# Patient Record
Sex: MALE | Race: WHITE | NOT HISPANIC OR LATINO | Employment: OTHER | URBAN - NONMETROPOLITAN AREA
[De-identification: names, ages, dates, MRNs, and addresses within clinical notes are randomized per-mention and may not be internally consistent; named-entity substitution may affect disease eponyms.]

---

## 2023-02-09 PROBLEM — K21.9 CHRONIC GERD: Status: ACTIVE | Noted: 2023-02-09

## 2023-02-09 PROBLEM — M25.531 RIGHT WRIST PAIN: Status: ACTIVE | Noted: 2023-02-09

## 2023-02-09 PROBLEM — F32.9 MDD (MAJOR DEPRESSIVE DISORDER): Status: ACTIVE | Noted: 2023-02-09

## 2023-02-09 PROBLEM — R20.0 HAND NUMBNESS: Status: ACTIVE | Noted: 2023-02-09

## 2023-02-09 PROBLEM — R12 HEARTBURN: Status: ACTIVE | Noted: 2023-02-09

## 2023-02-09 PROBLEM — I77.810 AORTIC ECTASIA, THORACIC (CMS-HCC): Status: ACTIVE | Noted: 2023-02-09

## 2023-02-09 PROBLEM — I10 HTN (HYPERTENSION): Status: ACTIVE | Noted: 2023-02-09

## 2023-02-09 PROBLEM — R06.83 SNORING: Status: ACTIVE | Noted: 2023-02-09

## 2023-02-09 PROBLEM — U07.1 DISEASE DUE TO SEVERE ACUTE RESPIRATORY SYNDROME CORONAVIRUS 2 (SARS-COV-2): Status: ACTIVE | Noted: 2023-02-09

## 2023-02-09 PROBLEM — M75.101 RIGHT ROTATOR CUFF TEAR: Status: ACTIVE | Noted: 2023-02-09

## 2023-02-09 PROBLEM — E11.9 DM2 (DIABETES MELLITUS, TYPE 2) (MULTI): Status: ACTIVE | Noted: 2023-02-09

## 2023-02-09 PROBLEM — N40.0 BPH (BENIGN PROSTATIC HYPERPLASIA): Status: ACTIVE | Noted: 2023-02-09

## 2023-02-09 PROBLEM — K22.89 PATULOUS LOWER ESOPHAGEAL SPHINCTER: Status: ACTIVE | Noted: 2023-02-09

## 2023-02-09 PROBLEM — R35.0 INCREASED URINARY FREQUENCY: Status: ACTIVE | Noted: 2023-02-09

## 2023-02-09 PROBLEM — G47.19 EXCESSIVE DAYTIME SLEEPINESS: Status: ACTIVE | Noted: 2023-02-09

## 2023-02-09 PROBLEM — R97.20 HIGH PROSTATE SPECIFIC ANTIGEN (PSA): Status: ACTIVE | Noted: 2023-02-09

## 2023-02-09 PROBLEM — D58.2 ELEVATED HEMOGLOBIN (CMS-HCC): Status: ACTIVE | Noted: 2023-02-09

## 2023-02-09 PROBLEM — G56.03 BILATERAL CARPAL TUNNEL SYNDROME: Status: ACTIVE | Noted: 2023-02-09

## 2023-02-09 PROBLEM — G47.33 OSA ON CPAP: Status: ACTIVE | Noted: 2023-02-09

## 2023-02-09 PROBLEM — R35.1 NOCTURIA: Status: ACTIVE | Noted: 2023-02-09

## 2023-02-09 PROBLEM — G47.00 INSOMNIA: Status: ACTIVE | Noted: 2023-02-09

## 2023-02-09 PROBLEM — R79.0 ABNORMAL BLOOD LEVEL OF MAGNESIUM: Status: ACTIVE | Noted: 2023-02-09

## 2023-02-09 PROBLEM — J01.90 ACUTE RHINOSINUSITIS: Status: ACTIVE | Noted: 2023-02-09

## 2023-02-09 PROBLEM — R20.2 POSITIVE TINEL'S SIGN: Status: ACTIVE | Noted: 2023-02-09

## 2023-02-09 RX ORDER — TAMSULOSIN HYDROCHLORIDE 0.4 MG/1
0.4 CAPSULE ORAL DAILY
COMMUNITY
Start: 2022-09-12 | End: 2023-10-09 | Stop reason: SDUPTHER

## 2023-02-09 RX ORDER — FAMOTIDINE 20 MG/1
20 TABLET, FILM COATED ORAL 2 TIMES DAILY
COMMUNITY

## 2023-02-09 RX ORDER — HYDROCORTISONE ACETATE 0.5 %
1500 CREAM (GRAM) TOPICAL AS NEEDED
COMMUNITY

## 2023-02-09 RX ORDER — ACETAMINOPHEN 500 MG
125 TABLET ORAL DAILY
COMMUNITY

## 2023-02-09 RX ORDER — CHOLECALCIFEROL (VITAMIN D3) 25 MCG
125 TABLET,CHEWABLE ORAL DAILY
COMMUNITY

## 2023-02-09 RX ORDER — EPINEPHRINE 0.22MG
100 AEROSOL WITH ADAPTER (ML) INHALATION DAILY
COMMUNITY

## 2023-02-09 RX ORDER — ATORVASTATIN CALCIUM 20 MG/1
20 TABLET, FILM COATED ORAL DAILY
COMMUNITY

## 2023-02-09 RX ORDER — CALCIUM CARBONATE 300MG(750)
400 TABLET,CHEWABLE ORAL DAILY
COMMUNITY

## 2023-02-09 RX ORDER — DULOXETIN HYDROCHLORIDE 60 MG/1
60 CAPSULE, DELAYED RELEASE ORAL DAILY
COMMUNITY
Start: 2020-10-19

## 2023-02-09 RX ORDER — METFORMIN HYDROCHLORIDE 500 MG/1
500 TABLET ORAL
COMMUNITY

## 2023-03-15 LAB
ALANINE AMINOTRANSFERASE (SGPT) (U/L) IN SER/PLAS: 18 U/L (ref 10–52)
ALBUMIN (G/DL) IN SER/PLAS: 4.5 G/DL (ref 3.4–5)
ALKALINE PHOSPHATASE (U/L) IN SER/PLAS: 63 U/L (ref 33–136)
ANION GAP IN SER/PLAS: 14 MMOL/L (ref 10–20)
ASPARTATE AMINOTRANSFERASE (SGOT) (U/L) IN SER/PLAS: 14 U/L (ref 9–39)
BILIRUBIN TOTAL (MG/DL) IN SER/PLAS: 0.6 MG/DL (ref 0–1.2)
CALCIUM (MG/DL) IN SER/PLAS: 9.9 MG/DL (ref 8.6–10.3)
CARBON DIOXIDE, TOTAL (MMOL/L) IN SER/PLAS: 27 MMOL/L (ref 21–32)
CHLORIDE (MMOL/L) IN SER/PLAS: 102 MMOL/L (ref 98–107)
CREATININE (MG/DL) IN SER/PLAS: 1.11 MG/DL (ref 0.5–1.3)
ESTIMATED AVERAGE GLUCOSE FOR HBA1C: 212 MG/DL
GFR MALE: 73 ML/MIN/1.73M2
GLUCOSE (MG/DL) IN SER/PLAS: 157 MG/DL (ref 74–99)
HEMOGLOBIN A1C/HEMOGLOBIN TOTAL IN BLOOD: 9 %
POTASSIUM (MMOL/L) IN SER/PLAS: 4.7 MMOL/L (ref 3.5–5.3)
PROTEIN TOTAL: 6.8 G/DL (ref 6.4–8.2)
SODIUM (MMOL/L) IN SER/PLAS: 138 MMOL/L (ref 136–145)
UREA NITROGEN (MG/DL) IN SER/PLAS: 26 MG/DL (ref 6–23)

## 2023-03-18 LAB
PROSTATE SPECIFIC AG (NG/ML) IN SER/PLAS: 4.3 NG/ML (ref 0–4)
PROSTATE SPECIFIC AG FREE (NG/ML) IN SER/PLAS: 1.6 NG/ML
PROSTATE SPECIFIC AG FREE/PROSTATE SPECIFIC AG TOTAL IN SER/PLAS: 37 %

## 2023-04-21 LAB — MAGNESIUM (MG/DL) IN SER/PLAS: 2.12 MG/DL (ref 1.6–2.4)

## 2023-04-24 ENCOUNTER — OFFICE VISIT (OUTPATIENT)
Dept: PRIMARY CARE | Facility: CLINIC | Age: 68
End: 2023-04-24
Payer: MEDICARE

## 2023-04-24 VITALS
HEART RATE: 77 BPM | SYSTOLIC BLOOD PRESSURE: 120 MMHG | WEIGHT: 228.1 LBS | DIASTOLIC BLOOD PRESSURE: 88 MMHG | BODY MASS INDEX: 31.93 KG/M2 | HEIGHT: 71 IN | OXYGEN SATURATION: 100 %

## 2023-04-24 DIAGNOSIS — R35.0 BENIGN PROSTATIC HYPERPLASIA WITH URINARY FREQUENCY: ICD-10-CM

## 2023-04-24 DIAGNOSIS — Z79.899 FOLLOW-UP ENCOUNTER INVOLVING MEDICATION: Primary | ICD-10-CM

## 2023-04-24 DIAGNOSIS — E78.1 HYPERTRIGLYCERIDEMIA: ICD-10-CM

## 2023-04-24 DIAGNOSIS — Z79.4 TYPE 2 DIABETES MELLITUS WITHOUT COMPLICATION, WITH LONG-TERM CURRENT USE OF INSULIN (MULTI): ICD-10-CM

## 2023-04-24 DIAGNOSIS — I10 PRIMARY HYPERTENSION: ICD-10-CM

## 2023-04-24 DIAGNOSIS — N40.1 BENIGN PROSTATIC HYPERPLASIA WITH URINARY FREQUENCY: ICD-10-CM

## 2023-04-24 DIAGNOSIS — E11.9 TYPE 2 DIABETES MELLITUS WITHOUT COMPLICATION, WITH LONG-TERM CURRENT USE OF INSULIN (MULTI): ICD-10-CM

## 2023-04-24 DIAGNOSIS — M76.899 HAMSTRING TENDONITIS: ICD-10-CM

## 2023-04-24 PROCEDURE — 3079F DIAST BP 80-89 MM HG: CPT | Performed by: STUDENT IN AN ORGANIZED HEALTH CARE EDUCATION/TRAINING PROGRAM

## 2023-04-24 PROCEDURE — 3052F HG A1C>EQUAL 8.0%<EQUAL 9.0%: CPT | Performed by: STUDENT IN AN ORGANIZED HEALTH CARE EDUCATION/TRAINING PROGRAM

## 2023-04-24 PROCEDURE — 99214 OFFICE O/P EST MOD 30 MIN: CPT | Performed by: STUDENT IN AN ORGANIZED HEALTH CARE EDUCATION/TRAINING PROGRAM

## 2023-04-24 PROCEDURE — 1160F RVW MEDS BY RX/DR IN RCRD: CPT | Performed by: STUDENT IN AN ORGANIZED HEALTH CARE EDUCATION/TRAINING PROGRAM

## 2023-04-24 PROCEDURE — 1159F MED LIST DOCD IN RCRD: CPT | Performed by: STUDENT IN AN ORGANIZED HEALTH CARE EDUCATION/TRAINING PROGRAM

## 2023-04-24 PROCEDURE — 1036F TOBACCO NON-USER: CPT | Performed by: STUDENT IN AN ORGANIZED HEALTH CARE EDUCATION/TRAINING PROGRAM

## 2023-04-24 PROCEDURE — 3074F SYST BP LT 130 MM HG: CPT | Performed by: STUDENT IN AN ORGANIZED HEALTH CARE EDUCATION/TRAINING PROGRAM

## 2023-04-24 RX ORDER — INSULIN GLARGINE 100 [IU]/ML
20 INJECTION, SOLUTION SUBCUTANEOUS NIGHTLY
COMMUNITY
Start: 2023-03-31

## 2023-04-24 ASSESSMENT — PATIENT HEALTH QUESTIONNAIRE - PHQ9
SUM OF ALL RESPONSES TO PHQ9 QUESTIONS 1 AND 2: 0
1. LITTLE INTEREST OR PLEASURE IN DOING THINGS: NOT AT ALL
2. FEELING DOWN, DEPRESSED OR HOPELESS: NOT AT ALL

## 2023-04-24 NOTE — PROGRESS NOTES
"Subjective   Patient ID: Jay Floyd is a 68 y.o. male who presents for 6 mth ov (Pulled a muscle in back lower thigh of right leg about 5 weeks ago. Has been hurting to get up out of a chair. ).    HPI    Reviewed results with patient. A1C increased. PSA stable. Other labs normal. Need lipid panel next labs.     Acute concerns:  Right lower extremity pain in the back of the thigh. Last few weeks. Slowly improving.   Plan: Likely hamstring tendinopathy.      Chronic:     DM: seeing endocrinologist(). last A1C  is 9 now- up from 7.3 recently. Has been taking Metformin, Jardiance and Trulicity. Was taking Trulicity until 2 weeks ago as his copay was $733. Insulin - 20 units. But patient thinks he takes only 1 unit.     Depression: Currently on Cymbalta dose to 60 mg. states to be compliant w/ med - denies any adverse rxns to med.  Denies SI/HI. Reports that his symptoms are under good control now. Reports that he has learned ways to deal with his coworkers. Reports that he eats better in the last couple weeks. Recommended to seek immediate medical attention if acute worsening of symptoms.    Insomnia:   Used to wake up for unknown reason. sometimes struggles to fall back asleep.   we will assess for obstructive sleep apnea- given his symptoms of snort arousals, excessive daytime sleepiness, insomnia, high BMI and neck circumference 16.5- Home sleep apnea test. was found to have severe sleep apnea. His sleep has been better with better diet. however, with going back to bad diet habits,     GERD  Controlled w/ pepcid - \"for the most part\" denies any adverse rxns to med. If eats late meals can have worsening of symptoms.   EGD Jun 2020  Path:  A. ANTRUM, BIOPSY:  --MILD CHRONIC INACTIVE GASTRITIS.  --NO MORPHOLOGIC EVIDENCE OF HELICOBACTER PYLORI-LIKE ORGANISMS.  B. DISTAL ESOPHAGUS, BIOPSY:  --CARDIO OXYNTIC MUCOSA WITH MILD FOVEOLAR HYPERPLASIA.  --NO EVIDENCE OF INTESTINAL METAPLASIA OR " DYSPLASIA.    Thoracic aortic ectasia, ascending  Stable    Diastolic blood pressure is elevated. Denies chest pain, palpitations or SOB    Hypertriglyceridemia- elevated to 474s. Has tried controlling his diet with avoiding processed food and pastries and candies. recheck before the next appointment.     Enlarged Prostate: Received MRI results from patient. This was done for prostate by urology. Found a possible filling defect in the distal left external iliac vein. Ultrasound is ordered for further evaluation for possible clot. Call the patient to further discuss however unable to reach the patient. Left a voicemail about ultrasound order. Recommended him to seek immediate medical attention by going to the emergency department if he has acute symptoms.     Review of Systems  ROS negative except discussed above in HPI.    Vitals:    04/24/23 0756   BP: 120/88   Pulse: 77   SpO2: 100%     Objective   Physical Exam  Constitutional:       Appearance: Normal appearance.   Cardiovascular:      Rate and Rhythm: Normal rate and regular rhythm.   Pulmonary:      Effort: Pulmonary effort is normal.      Breath sounds: Normal breath sounds.   Musculoskeletal:      Cervical back: Normal range of motion and neck supple.   Lymphadenopathy:      Cervical: No cervical adenopathy.   Neurological:      Mental Status: He is alert.       Assessment/Plan   Jay was seen today for 6 mth ov.  Diagnoses and all orders for this visit:  Hypertriglyceridemia (Primary)  -     Lipid Panel; Future  Hamstring tendonitis      Follow up in 6 months.         Gilles Bach MD MPH

## 2023-05-22 ENCOUNTER — TELEPHONE (OUTPATIENT)
Dept: PRIMARY CARE | Facility: CLINIC | Age: 68
End: 2023-05-22
Payer: COMMERCIAL

## 2023-05-22 DIAGNOSIS — G89.29 CHRONIC KNEE PAIN, UNSPECIFIED LATERALITY: Primary | ICD-10-CM

## 2023-05-22 DIAGNOSIS — M25.569 CHRONIC KNEE PAIN, UNSPECIFIED LATERALITY: Primary | ICD-10-CM

## 2023-05-22 NOTE — TELEPHONE ENCOUNTER
Calling in regards to pain that was evaluated in knee, wearing brace and using heat as directed but continues to have pain, please advise

## 2023-06-14 LAB
ANION GAP IN SER/PLAS: 11 MMOL/L (ref 10–20)
CALCIUM (MG/DL) IN SER/PLAS: 9.1 MG/DL (ref 8.6–10.3)
CARBON DIOXIDE, TOTAL (MMOL/L) IN SER/PLAS: 25 MMOL/L (ref 21–32)
CHLORIDE (MMOL/L) IN SER/PLAS: 108 MMOL/L (ref 98–107)
CHOLESTEROL (MG/DL) IN SER/PLAS: 112 MG/DL (ref 0–199)
CHOLESTEROL IN HDL (MG/DL) IN SER/PLAS: 34 MG/DL
CHOLESTEROL/HDL RATIO: 3.3
CREATININE (MG/DL) IN SER/PLAS: 0.96 MG/DL (ref 0.5–1.3)
ESTIMATED AVERAGE GLUCOSE FOR HBA1C: 223 MG/DL
GFR MALE: 87 ML/MIN/1.73M2
GLUCOSE (MG/DL) IN SER/PLAS: 194 MG/DL (ref 74–99)
HEMOGLOBIN A1C/HEMOGLOBIN TOTAL IN BLOOD: 9.4 %
LDL: 47 MG/DL (ref 0–99)
POTASSIUM (MMOL/L) IN SER/PLAS: 4.7 MMOL/L (ref 3.5–5.3)
SODIUM (MMOL/L) IN SER/PLAS: 139 MMOL/L (ref 136–145)
THYROTROPIN (MIU/L) IN SER/PLAS BY DETECTION LIMIT <= 0.05 MIU/L: 3.96 MIU/L (ref 0.44–3.98)
THYROXINE (T4) FREE (NG/DL) IN SER/PLAS: 0.87 NG/DL (ref 0.61–1.12)
TRIGLYCERIDE (MG/DL) IN SER/PLAS: 155 MG/DL (ref 0–149)
UREA NITROGEN (MG/DL) IN SER/PLAS: 25 MG/DL (ref 6–23)
VLDL: 31 MG/DL (ref 0–40)

## 2023-08-30 PROBLEM — R93.89 FILLING DEFECT ON IMAGING STUDY: Status: ACTIVE | Noted: 2023-08-30

## 2023-08-30 PROBLEM — Z04.9 CONDITION NOT FOUND: Status: ACTIVE | Noted: 2023-08-30

## 2023-08-30 PROBLEM — M77.8 TENDINITIS OF RIGHT WRIST: Status: ACTIVE | Noted: 2020-03-09

## 2023-08-30 PROBLEM — R93.5 ABNORMAL MRI, PELVIS: Status: ACTIVE | Noted: 2023-08-30

## 2023-08-30 RX ORDER — DULAGLUTIDE 0.75 MG/.5ML
0.75 INJECTION, SOLUTION SUBCUTANEOUS
COMMUNITY

## 2023-08-30 RX ORDER — BLOOD-GLUCOSE METER
EACH MISCELLANEOUS DAILY
COMMUNITY
Start: 2023-05-04

## 2023-10-05 ENCOUNTER — LAB (OUTPATIENT)
Dept: LAB | Facility: LAB | Age: 68
End: 2023-10-05
Payer: MEDICARE

## 2023-10-05 DIAGNOSIS — E11.65 TYPE 2 DIABETES MELLITUS WITH HYPERGLYCEMIA (MULTI): Primary | ICD-10-CM

## 2023-10-05 DIAGNOSIS — I10 ESSENTIAL (PRIMARY) HYPERTENSION: ICD-10-CM

## 2023-10-05 DIAGNOSIS — E78.2 MIXED HYPERLIPIDEMIA: ICD-10-CM

## 2023-10-05 DIAGNOSIS — R97.20 HIGH PROSTATE SPECIFIC ANTIGEN (PSA): ICD-10-CM

## 2023-10-05 DIAGNOSIS — Z79.899 OTHER LONG TERM (CURRENT) DRUG THERAPY: ICD-10-CM

## 2023-10-05 LAB
ALBUMIN SERPL BCP-MCNC: 4.7 G/DL (ref 3.4–5)
ALP SERPL-CCNC: 57 U/L (ref 33–136)
ALT SERPL W P-5'-P-CCNC: 13 U/L (ref 10–52)
ANION GAP SERPL CALC-SCNC: 9 MMOL/L (ref 10–20)
AST SERPL W P-5'-P-CCNC: 12 U/L (ref 9–39)
BILIRUB SERPL-MCNC: 0.7 MG/DL (ref 0–1.2)
BUN SERPL-MCNC: 19 MG/DL (ref 6–23)
CALCIUM SERPL-MCNC: 9.9 MG/DL (ref 8.6–10.3)
CHLORIDE SERPL-SCNC: 105 MMOL/L (ref 98–107)
CO2 SERPL-SCNC: 29 MMOL/L (ref 21–32)
CREAT SERPL-MCNC: 0.92 MG/DL (ref 0.5–1.3)
EST. AVERAGE GLUCOSE BLD GHB EST-MCNC: 194 MG/DL
GFR SERPL CREATININE-BSD FRML MDRD: >90 ML/MIN/1.73M*2
GLUCOSE SERPL-MCNC: 135 MG/DL (ref 74–99)
HBA1C MFR BLD: 8.4 %
POTASSIUM SERPL-SCNC: 4.5 MMOL/L (ref 3.5–5.3)
PROT SERPL-MCNC: 6.7 G/DL (ref 6.4–8.2)
PSA SERPL-MCNC: 3.29 NG/ML
SODIUM SERPL-SCNC: 138 MMOL/L (ref 136–145)

## 2023-10-05 PROCEDURE — 84153 ASSAY OF PSA TOTAL: CPT

## 2023-10-05 PROCEDURE — 83036 HEMOGLOBIN GLYCOSYLATED A1C: CPT

## 2023-10-05 PROCEDURE — 82570 ASSAY OF URINE CREATININE: CPT

## 2023-10-05 PROCEDURE — 80053 COMPREHEN METABOLIC PANEL: CPT

## 2023-10-05 PROCEDURE — 82043 UR ALBUMIN QUANTITATIVE: CPT

## 2023-10-05 PROCEDURE — 36415 COLL VENOUS BLD VENIPUNCTURE: CPT

## 2023-10-05 NOTE — PROGRESS NOTES
Patient presents to the office today for a 6 MO F/U w/PSA. Patient has a Hx of Elevated PSA. Most recent PSA wa3.29 (10/2023), prior was 4.3 (03/2023), prior was 4.57 (10/2022), prior was 2.43 (05/2020). Pt did recently have a MRI of prostate (11/22) showing 1. No evidence of clinical significant prostatic neoplasm. 2. Benign prostatic hyperplasia. LUTs are chronic and mild. No frequency and urgency. Denies dysuria and hematuria.. Nocturia x1.. Caffeine does worsen LUTs.. Fm Hx of Prostate CA (Father, his brother but not 100% for sure.) Flomax 0.4 MG for LUTs seems to be helping. .denies Hx of Kidney Stones. Denies Hx of UTI. ED is Chronic...No medication for this.

## 2023-10-06 LAB
CREAT UR-MCNC: 56.7 MG/DL (ref 20–370)
MICROALBUMIN UR-MCNC: <7 MG/L
MICROALBUMIN/CREAT UR: NORMAL MG/G{CREAT}

## 2023-10-09 ENCOUNTER — OFFICE VISIT (OUTPATIENT)
Dept: UROLOGY | Facility: CLINIC | Age: 68
End: 2023-10-09
Payer: MEDICARE

## 2023-10-09 VITALS — DIASTOLIC BLOOD PRESSURE: 92 MMHG | HEART RATE: 91 BPM | SYSTOLIC BLOOD PRESSURE: 158 MMHG

## 2023-10-09 DIAGNOSIS — R97.20 HIGH PROSTATE SPECIFIC ANTIGEN (PSA): Primary | ICD-10-CM

## 2023-10-09 DIAGNOSIS — R35.1 NOCTURIA: ICD-10-CM

## 2023-10-09 PROCEDURE — 3052F HG A1C>EQUAL 8.0%<EQUAL 9.0%: CPT | Performed by: STUDENT IN AN ORGANIZED HEALTH CARE EDUCATION/TRAINING PROGRAM

## 2023-10-09 PROCEDURE — 3077F SYST BP >= 140 MM HG: CPT | Performed by: STUDENT IN AN ORGANIZED HEALTH CARE EDUCATION/TRAINING PROGRAM

## 2023-10-09 PROCEDURE — 1159F MED LIST DOCD IN RCRD: CPT | Performed by: STUDENT IN AN ORGANIZED HEALTH CARE EDUCATION/TRAINING PROGRAM

## 2023-10-09 PROCEDURE — 99213 OFFICE O/P EST LOW 20 MIN: CPT | Performed by: STUDENT IN AN ORGANIZED HEALTH CARE EDUCATION/TRAINING PROGRAM

## 2023-10-09 PROCEDURE — 3062F POS MACROALBUMINURIA REV: CPT | Performed by: STUDENT IN AN ORGANIZED HEALTH CARE EDUCATION/TRAINING PROGRAM

## 2023-10-09 PROCEDURE — 3080F DIAST BP >= 90 MM HG: CPT | Performed by: STUDENT IN AN ORGANIZED HEALTH CARE EDUCATION/TRAINING PROGRAM

## 2023-10-09 PROCEDURE — 1160F RVW MEDS BY RX/DR IN RCRD: CPT | Performed by: STUDENT IN AN ORGANIZED HEALTH CARE EDUCATION/TRAINING PROGRAM

## 2023-10-09 PROCEDURE — 1036F TOBACCO NON-USER: CPT | Performed by: STUDENT IN AN ORGANIZED HEALTH CARE EDUCATION/TRAINING PROGRAM

## 2023-10-09 RX ORDER — TAMSULOSIN HYDROCHLORIDE 0.4 MG/1
0.4 CAPSULE ORAL 2 TIMES DAILY
Qty: 60 CAPSULE | Refills: 11 | Status: SHIPPED | OUTPATIENT
Start: 2023-10-09 | End: 2024-10-03

## 2023-10-09 NOTE — PROGRESS NOTES
Subjective   Patient ID: Jay Floyd is a 68 y.o. male who presents for No chief complaint on file.  HPI  Patient presents for a follow up regarding elevated PSA. LUTs are chronic and mild. No frequency and urgency. He denies dysuria and hematuria but reports nocturia x1. Caffeine does worsen LUTs. He continues to take Flomax 0.4 MG for LUTs and it is significantly effective. .He denies history of Kidney Stones. He denies history of UTI. Most recent PSA wa3.29 (10/2023), prior was 4.3 (03/2023), prior was 4.57 (10/2022), prior was 2.43 (05/2020). He wants to hold off on doing the biopsy since his wife is scheduled for a surgery. He has been taking Flomax daily.     We had a very long and extensive discussion with the patient regarding elevated PSA. I explained to him the pathophysiology, differential diagnosis, risk factor, associated conditions, and management. I explained to him that elevated PSA could be either due to BPH, prostate infection or inflammation or prostate adenocarcinoma. We discussed the need to do a work-up to rule out any underlying prostate adenocarcinoma in the form of MR fusion biopsy if his MRI is positive or regular TRUS biopsy of the MRI is negative or we cannot do an MRI of the prostate. We discussed at length the risk, benefit, potential complication, adverse events of prostate biopsy including pain, discomfort, urinary retention, hematuria, pneumaturia, hematospermia. Explained to him that there is a 2% to 5% risk of complicated urinary infection and urosepsis. Explained to him indicates it happens, he will need to be admitted for IV antibiotic for 2 to 3 days at the hospital.    He reports family history of Prostate CA (Father, his brother but not 100% for sure.)     Review of Systems  All systems were reviewed. Anything negative was noted in the HPI.    Objective   Physical Exam  HENT:      Right Ear: External ear normal.      Mouth/Throat:      Pharynx: Oropharynx is clear.    Abdominal:      General: Abdomen is flat. Bowel sounds are normal.      Palpations: Abdomen is soft.       Component      Latest Ref Rng 10/21/2022 3/15/2023 10/5/2023   PSA      <=4.00 ng/mL 4.57 (H)  4.3 (H)  3.29       Legend:  (H) High    Assessment/Plan   Diagnoses and all orders for this visit:  High prostate specific antigen (PSA)  -     Prostate Specific Antigen; Future       - Elevated PSA: Patient did recently have a MRI of prostate (11/22) showing 1. No evidence of clinical significant prostatic neoplasm. 2. Benign prostatic hyperplasia. We had a very long and extensive discussion with the patient regarding elevated PSA. I explained to him the pathophysiology, differential diagnosis, risk factor, associated conditions, and management. I explained to him that elevated PSA could be either due to BPH, prostate infection or inflammation or prostate adenocarcinoma. We discussed the need to do a work-up to rule out any underlying prostate adenocarcinoma in the form of MR fusion biopsy if his MRI is positive or regular TRUS biopsy of the MRI is negative or we cannot do an MRI of the prostate. We discussed at length the risk, benefit, potential complication, adverse events of prostate biopsy including pain, discomfort, urinary retention, hematuria, pneumaturia, hematospermia. Explained to him that there is a 2% to 5% risk of complicated urinary infection and urosepsis. Explained to him indicates it happens, he will need to be admitted for IV antibiotic for 2 to 3 days at the hospital. He chose to hold off on doing biopsy. We discussed that increasing tamsulosin BID increases his risk for orthostatic hypotension and bone fracture, to return to once daily if he experiences any side effects.    Plan:  PSA in 6 months  Medication: He will increase the dose of Flomax from 1 tablet daily to 2 tablets daily, stop in case of any side effects.     Follow up:  6 months    Scribed for Dr. Elias Elizabeth by Yumiko Kim  medical scribe, on 10/09/23 at 8:00 AM.

## 2023-10-23 ENCOUNTER — APPOINTMENT (OUTPATIENT)
Dept: PRIMARY CARE | Facility: CLINIC | Age: 68
End: 2023-10-23
Payer: MEDICARE

## 2024-01-05 ENCOUNTER — LAB (OUTPATIENT)
Dept: LAB | Facility: LAB | Age: 69
End: 2024-01-05
Payer: MEDICARE

## 2024-01-05 DIAGNOSIS — E78.2 MIXED HYPERLIPIDEMIA: ICD-10-CM

## 2024-01-05 DIAGNOSIS — Z79.899 OTHER LONG TERM (CURRENT) DRUG THERAPY: ICD-10-CM

## 2024-01-05 DIAGNOSIS — E11.65 TYPE 2 DIABETES MELLITUS WITH HYPERGLYCEMIA (MULTI): Primary | ICD-10-CM

## 2024-01-05 DIAGNOSIS — I10 ESSENTIAL (PRIMARY) HYPERTENSION: ICD-10-CM

## 2024-01-05 LAB
ANION GAP SERPL CALC-SCNC: 12 MMOL/L (ref 10–20)
BUN SERPL-MCNC: 16 MG/DL (ref 6–23)
CALCIUM SERPL-MCNC: 9.5 MG/DL (ref 8.6–10.3)
CHLORIDE SERPL-SCNC: 105 MMOL/L (ref 98–107)
CO2 SERPL-SCNC: 26 MMOL/L (ref 21–32)
CREAT SERPL-MCNC: 1.02 MG/DL (ref 0.5–1.3)
EST. AVERAGE GLUCOSE BLD GHB EST-MCNC: 186 MG/DL
GFR SERPL CREATININE-BSD FRML MDRD: 81 ML/MIN/1.73M*2
GLUCOSE SERPL-MCNC: 209 MG/DL (ref 74–99)
HBA1C MFR BLD: 8.1 %
POTASSIUM SERPL-SCNC: 4.3 MMOL/L (ref 3.5–5.3)
SODIUM SERPL-SCNC: 139 MMOL/L (ref 136–145)
VIT B12 SERPL-MCNC: 945 PG/ML (ref 211–911)

## 2024-01-05 PROCEDURE — 36415 COLL VENOUS BLD VENIPUNCTURE: CPT

## 2024-01-05 PROCEDURE — 80048 BASIC METABOLIC PNL TOTAL CA: CPT

## 2024-01-05 PROCEDURE — 82607 VITAMIN B-12: CPT

## 2024-01-05 PROCEDURE — 83036 HEMOGLOBIN GLYCOSYLATED A1C: CPT

## 2024-04-02 NOTE — PROGRESS NOTES
Patient presents to the office today for a 6 MO F/U w/PSA.  Patient states he has seen blood in his urine twice in the past 2 MO...Patient has a Hx of Elevated PSA. Most recent PSA was 5.55 (04/2024), prior was 4.3 (03/2023), prior was 4.57 (10/2022), prior was 2.43 (05/2020). Pt did recently have a MRI of prostate (11/22) showing 1. No evidence of clinical significant prostatic neoplasm. 2. Benign prostatic hyperplasia. LUTs are chronic and mild. No frequency and urgency. Denies dysuria and hematuria.. Nocturia x1.. Caffeine does worsen LUTs.. Fm Hx of Prostate CA (Father, his brother but not 100% for sure.) Flomax 0.4 MG for LUTs seems to be helping. .denies Hx of Kidney Stones. Denies Hx of UTI. ED is Chronic...No medication for this.

## 2024-04-04 ENCOUNTER — LAB (OUTPATIENT)
Dept: LAB | Facility: LAB | Age: 69
End: 2024-04-04
Payer: MEDICARE

## 2024-04-04 DIAGNOSIS — Z79.899 OTHER LONG TERM (CURRENT) DRUG THERAPY: ICD-10-CM

## 2024-04-04 DIAGNOSIS — R97.20 HIGH PROSTATE SPECIFIC ANTIGEN (PSA): ICD-10-CM

## 2024-04-04 DIAGNOSIS — E78.2 MIXED HYPERLIPIDEMIA: ICD-10-CM

## 2024-04-04 DIAGNOSIS — E11.65 TYPE 2 DIABETES MELLITUS WITH HYPERGLYCEMIA (MULTI): Primary | ICD-10-CM

## 2024-04-04 LAB
ALBUMIN SERPL BCP-MCNC: 4.5 G/DL (ref 3.4–5)
ALP SERPL-CCNC: 58 U/L (ref 33–136)
ALT SERPL W P-5'-P-CCNC: 15 U/L (ref 10–52)
ANION GAP SERPL CALC-SCNC: 14 MMOL/L (ref 10–20)
AST SERPL W P-5'-P-CCNC: 15 U/L (ref 9–39)
BILIRUB SERPL-MCNC: 0.8 MG/DL (ref 0–1.2)
BUN SERPL-MCNC: 19 MG/DL (ref 6–23)
CALCIUM SERPL-MCNC: 9.8 MG/DL (ref 8.6–10.3)
CHLORIDE SERPL-SCNC: 104 MMOL/L (ref 98–107)
CO2 SERPL-SCNC: 23 MMOL/L (ref 21–32)
CREAT SERPL-MCNC: 1.04 MG/DL (ref 0.5–1.3)
EGFRCR SERPLBLD CKD-EPI 2021: 79 ML/MIN/1.73M*2
EST. AVERAGE GLUCOSE BLD GHB EST-MCNC: 203 MG/DL
GLUCOSE SERPL-MCNC: 116 MG/DL (ref 74–99)
HBA1C MFR BLD: 8.7 %
POTASSIUM SERPL-SCNC: 4.2 MMOL/L (ref 3.5–5.3)
PROT SERPL-MCNC: 6.6 G/DL (ref 6.4–8.2)
PSA SERPL-MCNC: 5.55 NG/ML
SODIUM SERPL-SCNC: 137 MMOL/L (ref 136–145)

## 2024-04-04 PROCEDURE — 36415 COLL VENOUS BLD VENIPUNCTURE: CPT

## 2024-04-04 PROCEDURE — 80053 COMPREHEN METABOLIC PANEL: CPT

## 2024-04-04 PROCEDURE — 84153 ASSAY OF PSA TOTAL: CPT

## 2024-04-04 PROCEDURE — 83036 HEMOGLOBIN GLYCOSYLATED A1C: CPT

## 2024-04-08 ENCOUNTER — OFFICE VISIT (OUTPATIENT)
Dept: UROLOGY | Facility: CLINIC | Age: 69
End: 2024-04-08
Payer: MEDICARE

## 2024-04-08 VITALS
BODY MASS INDEX: 33.32 KG/M2 | DIASTOLIC BLOOD PRESSURE: 90 MMHG | SYSTOLIC BLOOD PRESSURE: 148 MMHG | HEART RATE: 92 BPM | WEIGHT: 238 LBS | HEIGHT: 71 IN

## 2024-04-08 DIAGNOSIS — R97.20 ELEVATED PSA: ICD-10-CM

## 2024-04-08 DIAGNOSIS — R31.0 GROSS HEMATURIA: ICD-10-CM

## 2024-04-08 PROCEDURE — 3077F SYST BP >= 140 MM HG: CPT | Performed by: STUDENT IN AN ORGANIZED HEALTH CARE EDUCATION/TRAINING PROGRAM

## 2024-04-08 PROCEDURE — 1159F MED LIST DOCD IN RCRD: CPT | Performed by: STUDENT IN AN ORGANIZED HEALTH CARE EDUCATION/TRAINING PROGRAM

## 2024-04-08 PROCEDURE — 3080F DIAST BP >= 90 MM HG: CPT | Performed by: STUDENT IN AN ORGANIZED HEALTH CARE EDUCATION/TRAINING PROGRAM

## 2024-04-08 PROCEDURE — 99214 OFFICE O/P EST MOD 30 MIN: CPT | Performed by: STUDENT IN AN ORGANIZED HEALTH CARE EDUCATION/TRAINING PROGRAM

## 2024-04-08 PROCEDURE — 3052F HG A1C>EQUAL 8.0%<EQUAL 9.0%: CPT | Performed by: STUDENT IN AN ORGANIZED HEALTH CARE EDUCATION/TRAINING PROGRAM

## 2024-04-08 PROCEDURE — 1036F TOBACCO NON-USER: CPT | Performed by: STUDENT IN AN ORGANIZED HEALTH CARE EDUCATION/TRAINING PROGRAM

## 2024-04-08 NOTE — PROGRESS NOTES
Subjective   Patient ID: Jay Floyd is a 69 y.o. male    HPI  69 y.o. male who presents to the office today for a 6 MO F/U w/PSA.  Patient states he has seen blood in his urine twice in the past 2 MO...Patient has a Hx of Elevated PSA. Most recent PSA was 5.55 (04/2024), prior was 4.3 (03/2023), prior was 4.57 (10/2022), prior was 2.43 (05/2020). Pt did recently have a MRI of prostate (11/22) showing 1. No evidence of clinical significant prostatic neoplasm. 2. Benign prostatic hyperplasia. LUTs are chronic and mild. No frequency and urgency. Denies dysuria and hematuria.. Nocturia x1.. Caffeine does worsen LUTs.. Fm Hx of Prostate CA (Father, his brother but not 100% for sure.) Flomax 0.4 MG for LUTs seems to be helping. .denies Hx of Kidney Stones. Denies Hx of UTI. ED is Chronic...No medication for this.       Review of Systems    All systems were reviewed. Anything negative was noted in the HPI.    Objective   Physical Exam    General: Well developed, well nourished, alert and cooperative, appears in no acute distress   Eyes: Non-injected conjunctiva, sclera clear, no proptosis   Cardiac: Extremities are warm and well perfused. No edema, cyanosis or pallor   Lungs: Breathing is easy, non-labored. Speaking in clear and complete sentences. Normal diaphragmatic movement   MSK: Ambulatory with steady gait, unassisted   Neuro: Alert and oriented to person, place, and time   Psych: Demonstrates good judgment and reason, without hallucinations, abnormal affect or abnormal behaviors   Skin: No obvious lesions, no rashes       No CVA tenderness bilaterally   No suprapubic pain or discomfort       No past medical history on file.      No past surgical history on file.        Assessment/Plan   HX of Ureterolithiasis, Elevated PSA    69 y.o. male who presents for the above condition,  We had a very long and extensive discussion with the patient regarding elevated PSA. I explained to him the pathophysiology,  differential diagnosis, risk factor, associated conditions, and management. I explained to him that elevated PSA could be either due to BPH, prostate infection or inflammation or prostate adenocarcinoma. We discussed the need to do a work-up to rule out any underlying prostate adenocarcinoma in the form of MR fusion biopsy if his MRI is positive or regular TRUS biopsy of the MRI is negative or we cannot do an MRI of the prostate. We discussed at length the risk, benefit, potential complication, adverse events of prostate biopsy including pain, discomfort, urinary retention, hematuria, pneumaturia, hematospermia. Explained to him that there is a 2% to 5% risk of complicated urinary infection and urosepsis. Explained to him indicates it happens, he will need to be admitted for IV antibiotic for 2 to 3 days at the hospital.      Plan:  - MR Prostate  - Follow up in 1 month            Scribe Attestation  By signing my name below, IMarilyn Scribe   attest that this documentation has been prepared under the direction and in the presence of Dr. Elias Elizabeth

## 2024-04-18 DIAGNOSIS — R97.20 HIGH PROSTATE SPECIFIC ANTIGEN (PSA): ICD-10-CM

## 2024-04-18 RX ORDER — DIAZEPAM 5 MG/1
TABLET ORAL
Qty: 1 TABLET | Refills: 0 | Status: SHIPPED | OUTPATIENT
Start: 2024-04-18

## 2024-04-19 ENCOUNTER — HOSPITAL ENCOUNTER (OUTPATIENT)
Dept: RADIOLOGY | Facility: HOSPITAL | Age: 69
Discharge: HOME | End: 2024-04-19
Payer: MEDICARE

## 2024-04-19 DIAGNOSIS — R31.0 GROSS HEMATURIA: ICD-10-CM

## 2024-04-19 DIAGNOSIS — R97.20 ELEVATED PSA: ICD-10-CM

## 2024-04-19 PROCEDURE — 76770 US EXAM ABDO BACK WALL COMP: CPT | Performed by: RADIOLOGY

## 2024-04-19 PROCEDURE — 2550000001 HC RX 255 CONTRASTS: Performed by: STUDENT IN AN ORGANIZED HEALTH CARE EDUCATION/TRAINING PROGRAM

## 2024-04-19 PROCEDURE — 72197 MRI PELVIS W/O & W/DYE: CPT | Performed by: RADIOLOGY

## 2024-04-19 PROCEDURE — 76770 US EXAM ABDO BACK WALL COMP: CPT

## 2024-04-19 PROCEDURE — 72197 MRI PELVIS W/O & W/DYE: CPT

## 2024-04-19 PROCEDURE — A9575 INJ GADOTERATE MEGLUMI 0.1ML: HCPCS | Performed by: STUDENT IN AN ORGANIZED HEALTH CARE EDUCATION/TRAINING PROGRAM

## 2024-04-19 RX ORDER — GADOTERATE MEGLUMINE 376.9 MG/ML
20 INJECTION INTRAVENOUS
Status: COMPLETED | OUTPATIENT
Start: 2024-04-19 | End: 2024-04-19

## 2024-04-19 RX ADMIN — GADOTERATE MEGLUMINE 20 ML: 376.9 INJECTION INTRAVENOUS at 16:12

## 2024-05-07 NOTE — PROGRESS NOTES
Patient presents to the office today for a 1 MO F/U w/MRI Prostate (04/19/2024) There is no evidence of clinically significant neoplasm...PI-RADS 2 - Low (clinically significant cancer is unlikely to be  present). and Renal US (04/19/2024) was unremarkable...Patient has a Hx of Elevated PSA. Most recent PSA was 5.55 (04/2024), prior was 4.3 (03/2023), prior was 4.57 (10/2022), prior was 2.43 (05/2020). Pt did recently have a MRI of prostate (11/22) showing 1. No evidence of clinical significant prostatic neoplasm. 2. Benign prostatic hyperplasia. LUTs are chronic and mild. No frequency and urgency. Denies dysuria and hematuria.. Nocturia x1.. Caffeine does worsen LUTs.. Fm Hx of Prostate CA (Father, his brother but not 100% for sure.) Flomax 0.4 MG for LUTs seems to be helping. .denies Hx of Kidney Stones. Denies Hx of UTI. ED is Chronic...No medication for this.

## 2024-05-13 ENCOUNTER — OFFICE VISIT (OUTPATIENT)
Dept: UROLOGY | Facility: CLINIC | Age: 69
End: 2024-05-13
Payer: MEDICARE

## 2024-05-13 VITALS — WEIGHT: 237.4 LBS | BODY MASS INDEX: 33.11 KG/M2

## 2024-05-13 DIAGNOSIS — R97.20 ELEVATED PROSTATE SPECIFIC ANTIGEN (PSA): Primary | ICD-10-CM

## 2024-05-13 PROCEDURE — 1159F MED LIST DOCD IN RCRD: CPT | Performed by: STUDENT IN AN ORGANIZED HEALTH CARE EDUCATION/TRAINING PROGRAM

## 2024-05-13 PROCEDURE — 3052F HG A1C>EQUAL 8.0%<EQUAL 9.0%: CPT | Performed by: STUDENT IN AN ORGANIZED HEALTH CARE EDUCATION/TRAINING PROGRAM

## 2024-05-13 PROCEDURE — 99214 OFFICE O/P EST MOD 30 MIN: CPT | Performed by: STUDENT IN AN ORGANIZED HEALTH CARE EDUCATION/TRAINING PROGRAM

## 2024-05-13 NOTE — PROGRESS NOTES
Subjective   Patient ID: Jay Floyd is a 69 y.o. male    HPI  69 y.o. male who  presents to the office today for a 1 MO F/U w/MRI Prostate (04/19/2024) There is no evidence of clinically significant neoplasm...PI-RADS 2 - Low (clinically significant cancer is unlikely to be  present). and Renal US (04/19/2024) was unremarkable...Patient has a Hx of Elevated PSA. Most recent PSA was 5.55 (04/2024), prior was 4.3 (03/2023), prior was 4.57 (10/2022), prior was 2.43 (05/2020). Pt did recently have a MRI of prostate (11/22) showing 1. No evidence of clinical significant prostatic neoplasm. 2. Benign prostatic hyperplasia. LUTs are chronic and mild. No frequency and urgency. Denies dysuria and hematuria.. Nocturia x1.. Caffeine does worsen LUTs.. Fm Hx of Prostate CA (Father, his brother but not 100% for sure.) Flomax 0.4 MG for LUTs seems to be helping. .denies Hx of Kidney Stones. Denies Hx of UTI. ED is Chronic...No medication for this.            Review of Systems    All systems were reviewed. Anything negative was noted in the HPI.    Objective   Physical Exam    General: Well developed, well nourished, alert and cooperative, appears in no acute distress   Eyes: Non-injected conjunctiva, sclera clear, no proptosis   Cardiac: Extremities are warm and well perfused. No edema, cyanosis or pallor   Lungs: Breathing is easy, non-labored. Speaking in clear and complete sentences. Normal diaphragmatic movement   MSK: Ambulatory with steady gait, unassisted   Neuro: Alert and oriented to person, place, and time   Psych: Demonstrates good judgment and reason, without hallucinations, abnormal affect or abnormal behaviors   Skin: No obvious lesions, no rashes       No CVA tenderness bilaterally   No suprapubic pain or discomfort       History reviewed. No pertinent past medical history.      History reviewed. No pertinent surgical history.        Assessment/Plan   Elevated PSA,   PI-RADS 2 on MR Prostate    69 y.o. male  who presents for the above condition,  We had a very long and extensive discussion with the patient regarding elevated PSA. I explained to him the pathophysiology, differential diagnosis, risk factor, associated conditions, and management. I explained to him that elevated PSA could be either due to BPH, prostate infection or inflammation or prostate adenocarcinoma. We discussed the need to do a work-up to rule out any underlying prostate adenocarcinoma in the form of MR fusion biopsy if his MRI is positive or regular TRUS biopsy of the MRI is negative or we cannot do an MRI of the prostate. We discussed at length the risk, benefit, potential complication, adverse events of prostate biopsy including pain, discomfort, urinary retention, hematuria, pneumaturia, hematospermia. Explained to him that there is a 2% to 5% risk of complicated urinary infection and urosepsis. Explained to him indicates it happens, he will need to be admitted for IV antibiotic for 2 to 3 days at the hospital.      Plan:  - Schedule for TRUS prostate biopsy        5/13/2024    Britta Attestation  By signing my name below, IMarilyn Scribe   attest that this documentation has been prepared under the direction and in the presence of Dr. Elias Elizabeth

## 2024-05-14 RX ORDER — CIPROFLOXACIN 500 MG/1
500 TABLET ORAL 2 TIMES DAILY
Qty: 6 TABLET | Refills: 0 | Status: SHIPPED | OUTPATIENT
Start: 2024-05-14 | End: 2024-05-17

## 2024-07-01 NOTE — PROGRESS NOTES
Patient ID: Jay Floyd is a 69 y.o. male.    Procedures  The patient was placed on his side. Lidocaine jelly was injected into the anus. We inserted the ultrasound probe into the rectum. The prostate was identified and the ultrasound performed. We then took samples under the ultrasound guidance. For details of the ultrasound findings and the number of cores taken, please see the remarks section of this note. The patient tolerated the procedure well. There were no complications. He was instructed on post procedure care.   PROBE B  6 CORES FROM SITE A-RIGHT PROSTATE, 6 CORES FORM SITE B-LEFT PROSTATE    PSA 5.55 (4/2024)  4.3 (3/2023)   4.47 (10/2022)  MRI PROSTATE- PI-RADS 2   [Follow-up Visit ___] : a follow-up visit  for [unfilled]

## 2024-07-08 ENCOUNTER — APPOINTMENT (OUTPATIENT)
Dept: UROLOGY | Facility: CLINIC | Age: 69
End: 2024-07-08
Payer: MEDICARE

## 2024-07-08 VITALS — BODY MASS INDEX: 32.2 KG/M2 | HEIGHT: 71 IN | WEIGHT: 230 LBS

## 2024-07-08 DIAGNOSIS — R97.20 ELEVATED PSA: Primary | ICD-10-CM

## 2024-07-08 PROCEDURE — 76872 US TRANSRECTAL: CPT | Performed by: STUDENT IN AN ORGANIZED HEALTH CARE EDUCATION/TRAINING PROGRAM

## 2024-07-08 PROCEDURE — 76942 ECHO GUIDE FOR BIOPSY: CPT | Performed by: STUDENT IN AN ORGANIZED HEALTH CARE EDUCATION/TRAINING PROGRAM

## 2024-07-08 PROCEDURE — G0416 PROSTATE BIOPSY, ANY MTHD: HCPCS

## 2024-07-08 PROCEDURE — G0416 PROSTATE BIOPSY, ANY MTHD: HCPCS | Performed by: PATHOLOGY

## 2024-07-08 PROCEDURE — 55700 PR PROSTATE NEEDLE BIOPSY ANY APPROACH: CPT | Performed by: STUDENT IN AN ORGANIZED HEALTH CARE EDUCATION/TRAINING PROGRAM

## 2024-07-08 NOTE — PROGRESS NOTES
Subjective   Patient ID: Jay Floyd is a 69 y.o. male    HPI  69 y.o. male who  presents to the office today for TRUS prostate biopsy after a 2 MO F/U w/MRI Prostate (04/19/2024) There is no evidence of clinically significant neoplasm...PI-RADS 2 - Low (clinically significant cancer is unlikely to be present). and Renal US (04/19/2024) was unremarkable...Patient has a Hx of Elevated PSA. Most recent PSA was 5.55 (04/2024), prior was 4.3 (03/2023), prior was 4.57 (10/2022), prior was 2.43 (05/2020). Pt did recently have a MRI of prostate (11/22) showing 1. No evidence of clinical significant prostatic neoplasm. 2. Benign prostatic hyperplasia. LUTs are chronic and mild. No frequency and urgency. Denies dysuria and hematuria.. Nocturia x1.. Caffeine does worsen LUTs.. Fm Hx of Prostate CA (Father, his brother but not 100% for sure.) Flomax 0.4 MG for LUTs seems to be helping. .denies Hx of Kidney Stones. Denies Hx of UTI. ED is Chronic...No medication for this.            Review of Systems    All systems were reviewed. Anything negative was noted in the HPI.    Objective   Physical Exam    General: Well developed, well nourished, alert and cooperative, appears in no acute distress   Eyes: Non-injected conjunctiva, sclera clear, no proptosis   Cardiac: Extremities are warm and well perfused. No edema, cyanosis or pallor   Lungs: Breathing is easy, non-labored. Speaking in clear and complete sentences. Normal diaphragmatic movement   MSK: Ambulatory with steady gait, unassisted   Neuro: Alert and oriented to person, place, and time   Psych: Demonstrates good judgment and reason, without hallucinations, abnormal affect or abnormal behaviors   Skin: No obvious lesions, no rashes       No CVA tenderness bilaterally   No suprapubic pain or discomfort       History reviewed. No pertinent past medical history.      History reviewed. No pertinent surgical history.  Patient ID: Jay Floyd is a 69 y.o. male.    Biopsy  prostate    Date/Time: 7/8/2024 10:24 AM    Performed by: Elias Elizabeth MD MPH  Authorized by: Elias Elizabeth MD MPH    Consent:     Consent obtained:  Written    Consent given by:  Patient    Risks, benefits, and alternatives were discussed: yes      Risks discussed:  Bleeding, infection, pain, poor cosmetic result, nerve damage and incomplete drainage    Alternatives discussed:  No treatment, delayed treatment, alternative treatment, observation and referral  Universal protocol:     Procedure explained and questions answered to patient or proxy's satisfaction: yes      Relevant documents present and verified: yes      Test results available: yes      Imaging studies available: yes      Required blood products, implants, devices, and special equipment available: yes      Immediately prior to procedure, a time out was called: yes      Patient identity confirmed:  Verbally with patient  Indications:     Indications:  Elevated PSA  Pre-procedure details:     Preparation: Patient was prepped and draped in the usual sterile fashion    Sedation:     Sedation type:  None  Anesthesia:     Anesthesia method:  Local infiltration    Local anesthetic:  Lidocaine 1% w/o epi  Post-procedure details:     Procedure completion:  Tolerated well, no immediate complications    PS 73gs    Assessment/Plan   Elevated PSA,   PI-RADS 2 on MR Prostate  TRUS prostate biopsy today    69 y.o. male who presents for the above condition,  We had a very long and extensive discussion with the patient regarding elevated PSA. I explained to him the pathophysiology, differential diagnosis, risk factor, associated conditions, and management. I explained to him that elevated PSA could be either due to BPH, prostate infection or inflammation or prostate adenocarcinoma. We discussed the need to do a work-up to rule out any underlying prostate adenocarcinoma in the form of MR fusion biopsy if his MRI is positive or regular TRUS biopsy of the MRI is  negative or we cannot do an MRI of the prostate. We discussed at length the risk, benefit, potential complication, adverse events of prostate biopsy including pain, discomfort, urinary retention, hematuria, pneumaturia, hematospermia. Explained to him that there is a 2% to 5% risk of complicated urinary infection and urosepsis. Explained to him indicates it happens, he will need to be admitted for IV antibiotic for 2 to 3 days at the hospital.      Plan:  - Follow up in 1 month for pathology results        7/8/2024    Scribe Attestation  By signing my name below, IMariyln Scribe   attest that this documentation has been prepared under the direction and in the presence of Dr. Elias Elizabeth

## 2024-07-12 ENCOUNTER — LAB (OUTPATIENT)
Dept: LAB | Facility: LAB | Age: 69
End: 2024-07-12
Payer: MEDICARE

## 2024-07-12 DIAGNOSIS — E78.2 MIXED HYPERLIPIDEMIA: ICD-10-CM

## 2024-07-12 DIAGNOSIS — Z79.899 OTHER LONG TERM (CURRENT) DRUG THERAPY: ICD-10-CM

## 2024-07-12 DIAGNOSIS — E11.65 TYPE 2 DIABETES MELLITUS WITH HYPERGLYCEMIA (MULTI): Primary | ICD-10-CM

## 2024-07-12 LAB
CHOLEST SERPL-MCNC: 105 MG/DL (ref 0–199)
CHOLESTEROL/HDL RATIO: 3.2
EST. AVERAGE GLUCOSE BLD GHB EST-MCNC: 166 MG/DL
HBA1C MFR BLD: 7.4 %
HDLC SERPL-MCNC: 33 MG/DL
LDLC SERPL CALC-MCNC: 50 MG/DL
NON HDL CHOLESTEROL: 72 MG/DL (ref 0–149)
T4 FREE SERPL-MCNC: 0.83 NG/DL (ref 0.61–1.12)
TRIGL SERPL-MCNC: 110 MG/DL (ref 0–149)
TSH SERPL-ACNC: 2.64 MIU/L (ref 0.44–3.98)
VLDL: 22 MG/DL (ref 0–40)

## 2024-07-12 PROCEDURE — 80061 LIPID PANEL: CPT

## 2024-07-12 PROCEDURE — 83036 HEMOGLOBIN GLYCOSYLATED A1C: CPT

## 2024-07-12 PROCEDURE — 84443 ASSAY THYROID STIM HORMONE: CPT

## 2024-07-12 PROCEDURE — 36415 COLL VENOUS BLD VENIPUNCTURE: CPT

## 2024-07-12 PROCEDURE — 84439 ASSAY OF FREE THYROXINE: CPT

## 2024-07-17 LAB
LABORATORY COMMENT REPORT: NORMAL
PATH REPORT.FINAL DX SPEC: NORMAL
PATH REPORT.GROSS SPEC: NORMAL
PATH REPORT.RELEVANT HX SPEC: NORMAL
PATH REPORT.TOTAL CANCER: NORMAL

## 2024-08-15 NOTE — PROGRESS NOTES
Patient presents to the office today for Prostate Bx ( 07/08/2024)... Results show B/L benign prostatic tissue with focal chronic inflammation... MRI Prostate (04/19/2024) There is no evidence of clinically significant neoplasm...PI-RADS 2 - Low (clinically significant cancer is unlikely to be  present). and Renal US (04/19/2024) was unremarkable...Patient has a Hx of Elevated PSA. Most recent PSA was  3.92 (08/2024), prior was 5.55 (04/2024), prior was 4.3 (03/2023), prior was 4.57 (10/2022), prior was 2.43 (05/2020). Pt did recently have a MRI of prostate (11/22) showing 1. No evidence of clinical significant prostatic neoplasm. 2. Benign prostatic hyperplasia. LUTs are chronic and mild. No frequency and urgency. Denies dysuria and hematuria.. Nocturia x1.. Caffeine does worsen LUTs.. Fm Hx of Prostate CA (Father, his brother but not 100% for sure.) Flomax 0.4 MG for LUTs seems to be helping. .denies Hx of Kidney Stones. Denies Hx of UTI. ED is Chronic...No medication for this.

## 2024-08-26 ENCOUNTER — APPOINTMENT (OUTPATIENT)
Dept: UROLOGY | Facility: CLINIC | Age: 69
End: 2024-08-26
Payer: MEDICARE

## 2024-08-26 VITALS
HEIGHT: 71 IN | WEIGHT: 233 LBS | SYSTOLIC BLOOD PRESSURE: 188 MMHG | HEART RATE: 114 BPM | BODY MASS INDEX: 32.62 KG/M2 | DIASTOLIC BLOOD PRESSURE: 97 MMHG

## 2024-08-26 DIAGNOSIS — D58.2 ELEVATED HEMOGLOBIN (CMS-HCC): ICD-10-CM

## 2024-08-26 DIAGNOSIS — R97.20 HIGH PROSTATE SPECIFIC ANTIGEN (PSA): Primary | ICD-10-CM

## 2024-08-26 PROCEDURE — 3008F BODY MASS INDEX DOCD: CPT | Performed by: STUDENT IN AN ORGANIZED HEALTH CARE EDUCATION/TRAINING PROGRAM

## 2024-08-26 PROCEDURE — 1036F TOBACCO NON-USER: CPT | Performed by: STUDENT IN AN ORGANIZED HEALTH CARE EDUCATION/TRAINING PROGRAM

## 2024-08-26 PROCEDURE — 3080F DIAST BP >= 90 MM HG: CPT | Performed by: STUDENT IN AN ORGANIZED HEALTH CARE EDUCATION/TRAINING PROGRAM

## 2024-08-26 PROCEDURE — 99215 OFFICE O/P EST HI 40 MIN: CPT | Performed by: STUDENT IN AN ORGANIZED HEALTH CARE EDUCATION/TRAINING PROGRAM

## 2024-08-26 PROCEDURE — 1159F MED LIST DOCD IN RCRD: CPT | Performed by: STUDENT IN AN ORGANIZED HEALTH CARE EDUCATION/TRAINING PROGRAM

## 2024-08-26 PROCEDURE — 3051F HG A1C>EQUAL 7.0%<8.0%: CPT | Performed by: STUDENT IN AN ORGANIZED HEALTH CARE EDUCATION/TRAINING PROGRAM

## 2024-08-26 PROCEDURE — 3048F LDL-C <100 MG/DL: CPT | Performed by: STUDENT IN AN ORGANIZED HEALTH CARE EDUCATION/TRAINING PROGRAM

## 2024-08-26 PROCEDURE — 3077F SYST BP >= 140 MM HG: CPT | Performed by: STUDENT IN AN ORGANIZED HEALTH CARE EDUCATION/TRAINING PROGRAM

## 2024-08-26 ASSESSMENT — ENCOUNTER SYMPTOMS
DEPRESSION: 0
OCCASIONAL FEELINGS OF UNSTEADINESS: 0
LOSS OF SENSATION IN FEET: 0

## 2024-08-26 NOTE — PROGRESS NOTES
Subjective   Patient ID: Jay Floyd is a 69 y.o. male    HPI  69 y.o. male who  presents to the office today for a follow up visit to review his Prostate Bx ( 07/08/2024)... Results show B/L benign prostatic tissue with focal chronic inflammation... MRI Prostate (04/19/2024) There is no evidence of clinically significant neoplasm...PI-RADS 2 - Low (clinically significant cancer is unlikely to be  present). and Renal US (04/19/2024) was unremarkable...Patient has a Hx of Elevated PSA. Most recent PSA was 3.92 (08/2024), prior was 5.55 (04/2024), prior was 4.3 (03/2023), prior was 4.57 (10/2022), prior was 2.43 (05/2020). Pt did recently have a MRI of prostate (11/22) showing 1. No evidence of clinical significant prostatic neoplasm. 2. Benign prostatic hyperplasia. LUTs are chronic and mild. No frequency and urgency. Denies dysuria and hematuria.. Nocturia x1.. Caffeine does worsen LUTs.. Fm Hx of Prostate CA (Father, his brother but not 100% for sure.) Flomax 0.4 MG for LUTs seems to be helping. .denies Hx of Kidney Stones. Denies Hx of UTI. ED is Chronic...No medication for this.            Review of Systems    All systems were reviewed. Anything negative was noted in the HPI.    Objective   Physical Exam    General: Well developed, well nourished, alert and cooperative, appears in no acute distress   Eyes: Non-injected conjunctiva, sclera clear, no proptosis   Cardiac: Extremities are warm and well perfused. No edema, cyanosis or pallor   Lungs: Breathing is easy, non-labored. Speaking in clear and complete sentences. Normal diaphragmatic movement   MSK: Ambulatory with steady gait, unassisted   Neuro: Alert and oriented to person, place, and time   Psych: Demonstrates good judgment and reason, without hallucinations, abnormal affect or abnormal behaviors   Skin: No obvious lesions, no rashes       No CVA tenderness bilaterally   No suprapubic pain or discomfort       No past medical history on file.      No  past surgical history on file.        Assessment/Plan   Elevated PSA trending down,   PI-RADS 2 on MR Prostate  TRUS revealed HGPIN    69 y.o. male who presents for the above condition,  We had a very long and extensive discussion with the patient regarding elevated PSA. I explained to him the pathophysiology, differential diagnosis, risk factor, associated conditions, and management. I explained to him that elevated PSA could be either due to BPH, prostate infection or inflammation or prostate adenocarcinoma. We discussed the need to do a work-up to rule out any underlying prostate adenocarcinoma in the form of MR fusion biopsy if his MRI is positive or regular TRUS biopsy of the MRI is negative or we cannot do an MRI of the prostate. We discussed at length the risk, benefit, potential complication, adverse events of prostate biopsy including pain, discomfort, urinary retention, hematuria, pneumaturia, hematospermia. Explained to him that there is a 2% to 5% risk of complicated urinary infection and urosepsis. Explained to him indicates it happens, he will need to be admitted for IV antibiotic for 2 to 3 days at the hospital.      I reviewed with the patient the epidemiology and natural history of HGPIN. I explained to him that such disease is typically managed with observation with serial PSA, MRI of the prostate  and Bx as needed.  I explained to him however several other variables can come into play, including the age of the patient, the trend of PSA change, the volume of disease, the comorbidities, and the genomic profile to predict the chance of progression in the future. I explained to him that my preference is active surveillance. we discussed the risk, benefit, step-by-step procedure, adverse events, side effects, outcomes, quality of life, and survival benefit of each therapy for the prostate cancer and HGPIN.    Plan:  - Follow up in 6 months with repeat PSA blood work.         8/26/2024    Britta  Attestation  By signing my name below, I, Britta Lopez   attest that this documentation has been prepared under the direction and in the presence of Dr. Elias Elizabeth

## 2024-08-28 DIAGNOSIS — R35.1 NOCTURIA: ICD-10-CM

## 2024-08-28 RX ORDER — TAMSULOSIN HYDROCHLORIDE 0.4 MG/1
0.4 CAPSULE ORAL DAILY
Qty: 90 CAPSULE | Refills: 1 | Status: SHIPPED | OUTPATIENT
Start: 2024-08-28

## 2024-08-30 DIAGNOSIS — R35.1 NOCTURIA: ICD-10-CM

## 2024-09-13 RX ORDER — TAMSULOSIN HYDROCHLORIDE 0.4 MG/1
CAPSULE ORAL
Qty: 180 CAPSULE | Refills: 3 | Status: SHIPPED | OUTPATIENT
Start: 2024-09-13

## 2024-10-18 ENCOUNTER — APPOINTMENT (OUTPATIENT)
Dept: LAB | Facility: LAB | Age: 69
End: 2024-10-18
Payer: MEDICARE

## 2024-10-18 ENCOUNTER — LAB (OUTPATIENT)
Dept: LAB | Facility: LAB | Age: 69
End: 2024-10-18
Payer: MEDICARE

## 2024-10-18 DIAGNOSIS — Z79.899 OTHER LONG TERM (CURRENT) DRUG THERAPY: ICD-10-CM

## 2024-10-18 DIAGNOSIS — E11.65 TYPE 2 DIABETES MELLITUS WITH HYPERGLYCEMIA (MULTI): Primary | ICD-10-CM

## 2024-10-18 DIAGNOSIS — I10 ESSENTIAL (PRIMARY) HYPERTENSION: ICD-10-CM

## 2024-10-18 LAB
ALBUMIN SERPL BCP-MCNC: 4.1 G/DL (ref 3.4–5)
ALP SERPL-CCNC: 71 U/L (ref 33–136)
ALT SERPL W P-5'-P-CCNC: 11 U/L (ref 10–52)
ANION GAP SERPL CALC-SCNC: 12 MMOL/L (ref 10–20)
AST SERPL W P-5'-P-CCNC: 11 U/L (ref 9–39)
BILIRUB SERPL-MCNC: 0.8 MG/DL (ref 0–1.2)
BUN SERPL-MCNC: 16 MG/DL (ref 6–23)
CALCIUM SERPL-MCNC: 9.1 MG/DL (ref 8.6–10.3)
CHLORIDE SERPL-SCNC: 102 MMOL/L (ref 98–107)
CO2 SERPL-SCNC: 28 MMOL/L (ref 21–32)
CREAT SERPL-MCNC: 0.96 MG/DL (ref 0.5–1.3)
CREAT UR-MCNC: 59.4 MG/DL (ref 20–370)
EGFRCR SERPLBLD CKD-EPI 2021: 87 ML/MIN/1.73M*2
EST. AVERAGE GLUCOSE BLD GHB EST-MCNC: 169 MG/DL
GLUCOSE SERPL-MCNC: 185 MG/DL (ref 74–99)
HBA1C MFR BLD: 7.5 %
MICROALBUMIN UR-MCNC: <7 MG/L
MICROALBUMIN/CREAT UR: NORMAL MG/G{CREAT}
POTASSIUM SERPL-SCNC: 4.6 MMOL/L (ref 3.5–5.3)
PROT SERPL-MCNC: 6 G/DL (ref 6.4–8.2)
SODIUM SERPL-SCNC: 137 MMOL/L (ref 136–145)

## 2024-10-18 PROCEDURE — 82043 UR ALBUMIN QUANTITATIVE: CPT

## 2024-10-18 PROCEDURE — 36415 COLL VENOUS BLD VENIPUNCTURE: CPT

## 2024-10-18 PROCEDURE — 83036 HEMOGLOBIN GLYCOSYLATED A1C: CPT

## 2024-10-18 PROCEDURE — 80053 COMPREHEN METABOLIC PANEL: CPT

## 2024-10-18 PROCEDURE — 82570 ASSAY OF URINE CREATININE: CPT

## 2025-02-20 LAB — PSA SERPL-MCNC: 4.47 NG/ML

## 2025-02-20 NOTE — PROGRESS NOTES
Subjective   Patient ID: Jay Floyd is a 69 y.o. male    HPI  69 y.o. male who  presents for a 6 months follow-up visit. Prostate Bx ( 07/08/2024) results show B/L benign prostatic tissue with focal chronic inflammation... MRI Prostate (04/19/2024) There is no evidence of clinically significant neoplasm...PI-RADS 2 - Low (clinically significant cancer is unlikely to be present). and Renal US (04/19/2024) was unremarkable...Patient has a Hx of Elevated PSA. Most recent PSA is 4.47 on 02/2025, prior was 3.92 (08/2024), prior was 5.55 (04/2024), prior was 4.3 (03/2023), prior was 4.57 (10/2022), prior was 2.43 (05/2020). Pt did recently have a MRI of prostate (11/22) showing 1. No evidence of clinical significant prostatic neoplasm. 2. Benign prostatic hyperplasia. LUTs are chronic and mild. No frequency and urgency. Denies dysuria and hematuria.. Nocturia x1.. Caffeine does worsen LUTs.. Fm Hx of Prostate CA (Father, his brother but not 100% for sure.) Flomax 0.4 MG for LUTs seems to be helping. .denies Hx of Kidney Stones. Denies Hx of UTI. ED is Chronic...No medication for this.     Today, he reports some urinary urgency and dribbling. He does take Flomax twice daily morning and night.     The most recent PSA, conducted on 02/19/2025, revealed:  4.47 ng/mL       Review of Systems    All systems were reviewed. Anything negative was noted in the HPI.    Objective   Physical Exam    General: Well developed, well nourished, alert and cooperative, appears in no acute distress   Eyes: Non-injected conjunctiva, sclera clear, no proptosis   Cardiac: Extremities are warm and well perfused. No edema, cyanosis or pallor   Lungs: Breathing is easy, non-labored. Speaking in clear and complete sentences. Normal diaphragmatic movement   MSK: Ambulatory with steady gait, unassisted   Neuro: Alert and oriented to person, place, and time   Psych: Demonstrates good judgment and reason, without hallucinations, abnormal affect or  abnormal behaviors   Skin: No obvious lesions, no rashes       No CVA tenderness bilaterally   No suprapubic pain or discomfort       No past medical history on file.      No past surgical history on file.        Assessment/Plan   Elevated PSA trending down,   PI-RADS 2 on MR Prostate  TRUS revealed HGPIN    69 y.o. male who presents for the above condition,  We had a very long and extensive discussion with the patient regarding elevated PSA. I explained to him the pathophysiology, differential diagnosis, risk factor, associated conditions, and management. I explained to him that elevated PSA could be either due to BPH, prostate infection or inflammation or prostate adenocarcinoma. We discussed the need to do a work-up to rule out any underlying prostate adenocarcinoma in the form of MR fusion biopsy if his MRI is positive or regular TRUS biopsy of the MRI is negative or we cannot do an MRI of the prostate. We discussed at length the risk, benefit, potential complication, adverse events of prostate biopsy including pain, discomfort, urinary retention, hematuria, pneumaturia, hematospermia. Explained to him that there is a 2% to 5% risk of complicated urinary infection and urosepsis. Explained to him indicates it happens, he will need to be admitted for IV antibiotic for 2 to 3 days at the hospital.      I reviewed with the patient the epidemiology and natural history of HGPIN. I explained to him that such disease is typically managed with observation with serial PSA, MRI of the prostate  and Bx as needed.  I explained to him however several other variables can come into play, including the age of the patient, the trend of PSA change, the volume of disease, the comorbidities, and the genomic profile to predict the chance of progression in the future. I explained to him that my preference is active surveillance. we discussed the risk, benefit, step-by-step procedure, adverse events, side effects, outcomes, quality of  life, and survival benefit of each therapy for the prostate cancer and HGPIN.     - Advised pt to avoid caffeine.   - Provided pt with surgical biopsy intervention and observation options.  - Pt elected to move forward with observation at this time.    Plan:  - Follow-up in 6 months with PSA      E&M visit today is associated with current or anticipated ongoing medical care services related to a patient's single, serious condition or a complex condition.     2/24/2025    Scribe Attestation  By signing my name below, I, Brtita Radford attest that this documentation has been prepared under the direction and in the presence of Dr. Elias Elizabeth.

## 2025-02-24 ENCOUNTER — APPOINTMENT (OUTPATIENT)
Dept: UROLOGY | Facility: CLINIC | Age: 70
End: 2025-02-24
Payer: MEDICARE

## 2025-02-24 DIAGNOSIS — R97.20 HIGH PROSTATE SPECIFIC ANTIGEN (PSA): Primary | ICD-10-CM

## 2025-02-24 PROCEDURE — G2211 COMPLEX E/M VISIT ADD ON: HCPCS | Performed by: STUDENT IN AN ORGANIZED HEALTH CARE EDUCATION/TRAINING PROGRAM

## 2025-02-24 PROCEDURE — 99214 OFFICE O/P EST MOD 30 MIN: CPT | Performed by: STUDENT IN AN ORGANIZED HEALTH CARE EDUCATION/TRAINING PROGRAM

## 2025-02-24 PROCEDURE — 1036F TOBACCO NON-USER: CPT | Performed by: STUDENT IN AN ORGANIZED HEALTH CARE EDUCATION/TRAINING PROGRAM

## 2025-02-24 PROCEDURE — 1159F MED LIST DOCD IN RCRD: CPT | Performed by: STUDENT IN AN ORGANIZED HEALTH CARE EDUCATION/TRAINING PROGRAM

## 2025-02-24 ASSESSMENT — PATIENT HEALTH QUESTIONNAIRE - PHQ9
2. FEELING DOWN, DEPRESSED OR HOPELESS: NOT AT ALL
SUM OF ALL RESPONSES TO PHQ9 QUESTIONS 1 AND 2: 0
1. LITTLE INTEREST OR PLEASURE IN DOING THINGS: NOT AT ALL

## 2025-08-04 DIAGNOSIS — R97.20 ELEVATED PSA: ICD-10-CM

## 2025-08-05 LAB — PSA SERPL-MCNC: 3.41 NG/ML

## 2025-08-25 ENCOUNTER — APPOINTMENT (OUTPATIENT)
Dept: UROLOGY | Facility: CLINIC | Age: 70
End: 2025-08-25
Payer: MEDICARE

## 2025-08-25 VITALS
DIASTOLIC BLOOD PRESSURE: 72 MMHG | BODY MASS INDEX: 32.78 KG/M2 | HEART RATE: 92 BPM | SYSTOLIC BLOOD PRESSURE: 108 MMHG | WEIGHT: 235 LBS

## 2025-08-25 DIAGNOSIS — N52.9 ERECTILE DYSFUNCTION, UNSPECIFIED ERECTILE DYSFUNCTION TYPE: ICD-10-CM

## 2025-08-25 DIAGNOSIS — R97.20 HIGH PROSTATE SPECIFIC ANTIGEN (PSA): Primary | ICD-10-CM

## 2025-08-25 PROCEDURE — 3074F SYST BP LT 130 MM HG: CPT | Performed by: STUDENT IN AN ORGANIZED HEALTH CARE EDUCATION/TRAINING PROGRAM

## 2025-08-25 PROCEDURE — G2211 COMPLEX E/M VISIT ADD ON: HCPCS | Performed by: STUDENT IN AN ORGANIZED HEALTH CARE EDUCATION/TRAINING PROGRAM

## 2025-08-25 PROCEDURE — 4010F ACE/ARB THERAPY RXD/TAKEN: CPT | Performed by: STUDENT IN AN ORGANIZED HEALTH CARE EDUCATION/TRAINING PROGRAM

## 2025-08-25 PROCEDURE — 1159F MED LIST DOCD IN RCRD: CPT | Performed by: STUDENT IN AN ORGANIZED HEALTH CARE EDUCATION/TRAINING PROGRAM

## 2025-08-25 PROCEDURE — 99214 OFFICE O/P EST MOD 30 MIN: CPT | Performed by: STUDENT IN AN ORGANIZED HEALTH CARE EDUCATION/TRAINING PROGRAM

## 2025-08-25 PROCEDURE — 1126F AMNT PAIN NOTED NONE PRSNT: CPT | Performed by: STUDENT IN AN ORGANIZED HEALTH CARE EDUCATION/TRAINING PROGRAM

## 2025-08-25 PROCEDURE — 3078F DIAST BP <80 MM HG: CPT | Performed by: STUDENT IN AN ORGANIZED HEALTH CARE EDUCATION/TRAINING PROGRAM

## 2025-08-25 RX ORDER — LISINOPRIL 2.5 MG/1
2.5 TABLET ORAL DAILY
COMMUNITY

## 2025-08-25 RX ORDER — INSULIN ASPART 100 [IU]/ML
INJECTION, SUSPENSION SUBCUTANEOUS
COMMUNITY
Start: 2025-07-05

## 2025-08-25 RX ORDER — SILDENAFIL 50 MG/1
50 TABLET, FILM COATED ORAL DAILY PRN
Qty: 10 TABLET | Refills: 0 | Status: SHIPPED | OUTPATIENT
Start: 2025-08-25 | End: 2025-09-24

## 2025-08-25 ASSESSMENT — PAIN SCALES - GENERAL: PAINLEVEL_OUTOF10: 0-NO PAIN

## 2026-02-23 ENCOUNTER — APPOINTMENT (OUTPATIENT)
Dept: UROLOGY | Facility: CLINIC | Age: 71
End: 2026-02-23
Payer: MEDICARE